# Patient Record
(demographics unavailable — no encounter records)

---

## 2017-11-30 NOTE — MAMMOGRAPHY REPORT
BILATERAL DIGITAL SCREENING MAMMOGRAM with CAD: 11/30/17



CLINICAL: Routine screening.Previous left benign surgical biopsy.



COMPARISON:11/15/16



FINDINGS: The breasts are heterogeneously dense, which may obscure 

small masses.  A right asymmetry and left asymmetry and architectural 

distortion require additional imaging.No suspicious calcifications.



IMPRESSION: Bilateral asymmetries requiring further workup.



BI-RADS CATEGORY:  0 -- Additional Imaging Evaluation Required



RECOMMENDATION: Recall for bilateral lateralmedial and spot compression 

views and bilateral breast ultrasound if needed.



ACR BI-RADS MAMMOGRAPHIC CODES:

0 = Needs additional imaging evaluation; 1 = Negative; 2 = Benign; 3 = 

Probably benign; 4 = Suspicious; 5 = Malignant; 6 = Known biopsy-proven 

malignancy



COMMENT:

      1.   Dense breast tissue, i.e., adenosis, fibrocystic 

            changes, etc., may obscure an underlying neoplasm.

      2.   Approximately 10% of cancers are not detected with

            mammography.

      3.   A negative mammography report should not delay biopsy 

            if a clinically suspicious mass is present.



COMMENT:

Patient follow-up letters are generated via our [employer] application.

## 2018-10-23 NOTE — ULTRASOUND REPORT
BILATERAL DIGITAL DIAGNOSTIC MAMMOGRAM and RIGHT BREAST ULTRASOUND: 

10/23/18 07:59:00



CLINICAL: Previous left benign surgical biopsy.  The patient did not 

return for followup for asymmetries identified on 11/30/17.



COMPARISON:11/30/17 and 11/15/16



FINDINGS: The breasts are heterogeneously dense, which may obscure 

small masses.The previously described left upper asymmetry and 

architectural distortion is less prominent and demonstrates 

satisfactory effacement with spot compression.  A circumscribed right 

inner asymmetry persists on spot views. It is oval with a mild lobular 

contour and measures approximately 8 mm.  



Ultrasound of the inner right breast was performed and demonstrated an 

oval complex cyst at 12 o'clock 4 cm from the nipple.  It measures 5 x 

5 x 4 mm and appears to correlate with the mammographic density.



IMPRESSION: Negative left breast and a probably benign complex cyst of 

the right breast which appears to have been mammographically stable for 

almost one year.  Recommend six month followup right mammogram and 

right breast ultrasound.



BI-RADS CATEGORY:  3 - - Probably Benign







ACR BI-RADS MAMMOGRAPHIC CODES:

0 = Needs additional imaging evaluation; 1 = Negative; 2 = Benign; 3 = 

Probably benign; 4 = Suspicious; 5 = Malignant; 6 = Known biopsy-proven 

malignancy



COMMENT:

      1.   Dense breast tissue, i.e., adenosis, fibrocystic 

            changes, etc., may obscure an underlying neoplasm.

      2.   Approximately 10% of cancers are not detected with

            mammography.

      3.   A negative mammography report should not delay biopsy 

            if a clinically suspicious mass is present.





COMMENT:

Patient follow-up letters are generated by our [employer] application.

## 2018-10-23 NOTE — MAMMOGRAPHY REPORT
BILATERAL DIGITAL DIAGNOSTIC MAMMOGRAM and RIGHT BREAST ULTRASOUND: 

10/23/18 07:59:00



CLINICAL: Previous left benign surgical biopsy.  The patient did not 

return for followup for asymmetries identified on 11/30/17.



COMPARISON:11/30/17 and 11/15/16



FINDINGS: The breasts are heterogeneously dense, which may obscure 

small masses.The previously described left upper asymmetry and 

architectural distortion is less prominent and demonstrates 

satisfactory effacement with spot compression.  A circumscribed right 

inner asymmetry persists on spot views. It is oval with a mild lobular 

contour and measures approximately 8 mm.  



Ultrasound of the inner right breast was performed and demonstrated an 

oval complex cyst at 12 o'clock 4 cm from the nipple.  It measures 5 x 

5 x 4 mm and appears to correlate with the mammographic density.



IMPRESSION: Negative left breast and a probably benign complex cyst of 

the right breast which appears to have been mammographically stable for 

almost one year.  Recommend six month followup right mammogram and 

right breast ultrasound.



BI-RADS CATEGORY:  3 - - Probably Benign







ACR BI-RADS MAMMOGRAPHIC CODES:

0 = Needs additional imaging evaluation; 1 = Negative; 2 = Benign; 3 = 

Probably benign; 4 = Suspicious; 5 = Malignant; 6 = Known biopsy-proven 

malignancy



COMMENT:

      1.   Dense breast tissue, i.e., adenosis, fibrocystic 

            changes, etc., may obscure an underlying neoplasm.

      2.   Approximately 10% of cancers are not detected with

            mammography.

      3.   A negative mammography report should not delay biopsy 

            if a clinically suspicious mass is present.





COMMENT:

Patient follow-up letters are generated by our [employer] application.